# Patient Record
Sex: FEMALE | Race: WHITE | NOT HISPANIC OR LATINO | Employment: FULL TIME | URBAN - METROPOLITAN AREA
[De-identification: names, ages, dates, MRNs, and addresses within clinical notes are randomized per-mention and may not be internally consistent; named-entity substitution may affect disease eponyms.]

---

## 2024-07-08 ENCOUNTER — HOSPITAL ENCOUNTER (EMERGENCY)
Facility: HOSPITAL | Age: 59
Discharge: HOME | End: 2024-07-08
Payer: COMMERCIAL

## 2024-07-08 VITALS
OXYGEN SATURATION: 100 % | HEART RATE: 70 BPM | BODY MASS INDEX: 23.7 KG/M2 | SYSTOLIC BLOOD PRESSURE: 125 MMHG | DIASTOLIC BLOOD PRESSURE: 86 MMHG | HEIGHT: 67 IN | TEMPERATURE: 99.4 F | WEIGHT: 151.01 LBS | RESPIRATION RATE: 16 BRPM
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
## 2024-07-08 PROCEDURE — 2500000001 HC RX 250 WO HCPCS SELF ADMINISTERED DRUGS (ALT 637 FOR MEDICARE OP)

## 2024-07-08 PROCEDURE — 2500000005 HC RX 250 GENERAL PHARMACY W/O HCPCS

## 2024-07-08 PROCEDURE — 2500000004 HC RX 250 GENERAL PHARMACY W/ HCPCS (ALT 636 FOR OP/ED)

## 2024-07-08 PROCEDURE — 12041 INTMD RPR N-HF/GENIT 2.5CM/<: CPT

## 2024-07-08 PROCEDURE — 90715 TDAP VACCINE 7 YRS/> IM: CPT

## 2024-07-08 PROCEDURE — 90471 IMMUNIZATION ADMIN: CPT

## 2024-07-08 PROCEDURE — 12001 RPR S/N/AX/GEN/TRNK 2.5CM/<: CPT

## 2024-07-08 PROCEDURE — 99283 EMERGENCY DEPT VISIT LOW MDM: CPT

## 2024-07-08 RX ORDER — LIDOCAINE HYDROCHLORIDE 10 MG/ML
5 INJECTION INFILTRATION; PERINEURAL ONCE
Status: COMPLETED | OUTPATIENT
Start: 2024-07-08 | End: 2024-07-08

## 2024-07-08 RX ORDER — ACETAMINOPHEN 325 MG/1
975 TABLET ORAL ONCE
Status: COMPLETED | OUTPATIENT
Start: 2024-07-08 | End: 2024-07-08

## 2024-07-08 RX ORDER — AMOXICILLIN AND CLAVULANATE POTASSIUM 875; 125 MG/1; MG/1
1 TABLET, FILM COATED ORAL ONCE
Status: COMPLETED | OUTPATIENT
Start: 2024-07-08 | End: 2024-07-08

## 2024-07-08 RX ORDER — AMOXICILLIN AND CLAVULANATE POTASSIUM 875; 125 MG/1; MG/1
1 TABLET, FILM COATED ORAL 2 TIMES DAILY
Qty: 14 TABLET | Refills: 0 | Status: SHIPPED | OUTPATIENT
Start: 2024-07-08 | End: 2024-07-15

## 2024-07-08 RX ORDER — IBUPROFEN 400 MG/1
400 TABLET ORAL ONCE
Status: COMPLETED | OUTPATIENT
Start: 2024-07-08 | End: 2024-07-08

## 2024-07-08 ASSESSMENT — PAIN DESCRIPTION - ORIENTATION: ORIENTATION: LEFT;RIGHT

## 2024-07-08 ASSESSMENT — PAIN DESCRIPTION - FREQUENCY: FREQUENCY: CONSTANT/CONTINUOUS

## 2024-07-08 ASSESSMENT — PAIN DESCRIPTION - PROGRESSION: CLINICAL_PROGRESSION: NOT CHANGED

## 2024-07-08 ASSESSMENT — PAIN DESCRIPTION - DESCRIPTORS: DESCRIPTORS: THROBBING

## 2024-07-08 ASSESSMENT — PAIN DESCRIPTION - PAIN TYPE
TYPE: ACUTE PAIN
TYPE: ACUTE PAIN

## 2024-07-08 ASSESSMENT — COLUMBIA-SUICIDE SEVERITY RATING SCALE - C-SSRS
1. IN THE PAST MONTH, HAVE YOU WISHED YOU WERE DEAD OR WISHED YOU COULD GO TO SLEEP AND NOT WAKE UP?: NO
2. HAVE YOU ACTUALLY HAD ANY THOUGHTS OF KILLING YOURSELF?: NO
6. HAVE YOU EVER DONE ANYTHING, STARTED TO DO ANYTHING, OR PREPARED TO DO ANYTHING TO END YOUR LIFE?: NO

## 2024-07-08 ASSESSMENT — PAIN SCALES - GENERAL
PAINLEVEL_OUTOF10: 1
PAINLEVEL_OUTOF10: 4

## 2024-07-08 ASSESSMENT — PAIN DESCRIPTION - LOCATION
LOCATION: HAND
LOCATION: HAND

## 2024-07-08 ASSESSMENT — PAIN DESCRIPTION - ONSET: ONSET: SUDDEN

## 2024-07-08 ASSESSMENT — PAIN - FUNCTIONAL ASSESSMENT
PAIN_FUNCTIONAL_ASSESSMENT: 0-10
PAIN_FUNCTIONAL_ASSESSMENT: 0-10

## 2024-07-08 NOTE — ED PROVIDER NOTES
HPI   Chief Complaint   Patient presents with    Animal Bite     I tried to stop the dog from eating chocolate and he bit both of my hand lt hand on the thumb pad and on the rt hand the ring finger it feels throbbing        Patient is a 59-year-old female presents emergency department for evaluation of dog bite to bilateral hands.  Patient states that she dropped a piece of chocolate on the ground her dog went to get it and she tried to pull the dog away from a chocolate.  She states that her dog does have some food aggression and bit her right and left hand.  She sustained puncture wounds to the palm of the left hand and middle finger of the right hand.  She states that her tetanus shot may have been just over 5 years ago.  She is not currently on any blood thinners.  She states the middle finger on her right hand is somewhat numb.  She denies any other bites to the rest of the body.  She states that her dog is masticated up-to-date on all immunizations.  She states that she feels relatively well otherwise.      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  No family history on file.  Social History     Tobacco Use    Smoking status: Not on file    Smokeless tobacco: Not on file   Substance Use Topics    Alcohol use: Not on file    Drug use: Not on file       Physical Exam   ED Triage Vitals [07/08/24 1836]    Temperature Heart Rate Respirations BP   37.4 °C (99.4 °F) 69 18 122/83      Pulse Ox Temp Source Heart Rate Source Patient Position   98 % Temporal Monitor Sitting      BP Location FiO2 (%)     Right arm --       Physical Exam  Constitutional:       Appearance: Normal appearance.   Cardiovascular:      Rate and Rhythm: Normal rate and regular rhythm.   Pulmonary:      Effort: Pulmonary effort is normal.      Breath sounds: Normal breath sounds.   Musculoskeletal:         General: Tenderness and signs of injury present. Normal range of motion.      Comments: Flexion and extension of all digits of bilateral hands intact with some pain with range of motion of thumb to left hand.  Good capillary refill to all digits of bilateral hands.  Some sensation changes to ring finger of right hand.   Skin:     General: Skin is warm and dry.      Comments: Abrasion to right middle finger with puncture wounds and abrasions to right middle finger.  Puncture wounds to left thenar eminence of left palm with 1 cm open laceration with abrasions and small puncture wounds to ulnar aspect of left dorsum of hand.   Neurological:      General: No focal deficit present.      Mental Status: She is alert and oriented to person, place, and time.         ED Course & MDM   Diagnoses as of 07/08/24 2031   Dog bite, initial encounter       Medical Decision Making  Patient is a 59-year-old female presents emergency department for evaluation of dog bite to bilateral hands.    Lab work and scans not warranted at today's visit.    Medications given at today's visit include PO Augmentin, ibuprofen, Tylenol, IM tetanus    I saw this patient independently.  Patient's wounds were copiously cleaned and irrigated by me.  I injected anesthetic into all wounds to allow for copious saline and Betasept irrigation of all wounds to bilateral hands.  Patient tolerated this well.  Small laceration open and gaping to left thenar eminence of left palm which required  1 tacking stitch as detailed in separate procedure note by me to allow for better healing.  Wounds were dressed by me.  Patient was educated on continued wound care.  Tetanus was updated at today's visit.  Patient will be discharged home on oral antibiotics and will follow-up closely outpatient with primary care provider.  Given some numbness and tingling in the right ring finger, she will follow-up closely with orthopedic hand specialist to ensure for good healing of wounds.  She is agreeable to plan and discharge at this time.  Emergent pathologies were considered for this patient, although I have low suspicion for anything acutely emergent given patient's clinical presentation, history, physical exam, stable vital signs, and relatively unremarkable workup.  Discharging patient home is reasonable plan of care for outpatient management.    All labs, imaging, and diagnostic studies were reviewed by me and patient was counseled on clinical impression, expectations, and plan.  Patient was educated to follow-up with PCP in the following 1-2 days.  All questions from patient were answered. They elicited understanding and were agreeable to course of treatment.  Patient was discharged in stable condition and given strict return precautions.    Prescriptions given on discharge: PO Augmentin    ** Disclaimer:  Parts of this document were written utilizing a voice to text dictation software.  Note may contain minor transcription or typographical errors that were inadvertently transcribed by the computer software.        Procedure  Laceration Repair    Performed by: Juju Lopez PA-C  Authorized by: Juju Lopez PA-C    Consent:     Consent obtained:  Verbal    Consent given by:  Patient    Risks, benefits, and alternatives were discussed: yes      Risks discussed:  Infection, poor cosmetic result, poor wound healing, need for additional repair, retained foreign body, vascular damage, tendon damage, pain and nerve  damage    Alternatives discussed:  No treatment  Universal protocol:     Patient identity confirmed:  Verbally with patient  Anesthesia:     Anesthesia method:  Local infiltration    Local anesthetic:  Lidocaine 1% w/o epi  Laceration details:     Location:  Hand    Hand location:  L palm    Length (cm):  1  Exploration:     Wound exploration: wound explored through full range of motion    Treatment:     Area cleansed with:  Chlorhexidine and saline    Amount of cleaning:  Extensive    Irrigation solution:  Sterile saline    Irrigation method:  Syringe  Skin repair:     Repair method:  Sutures    Suture size:  4-0    Suture material:  Nylon    Suture technique:  Simple interrupted    Number of sutures:  1  Approximation:     Approximation:  Close  Repair type:     Repair type:  Intermediate  Post-procedure details:     Dressing:  Bulky dressing    Procedure completion:  Tolerated well, no immediate complications       Juju Lopez PA-C  07/08/24 1218

## 2024-07-09 NOTE — DISCHARGE INSTRUCTIONS
Please follow-up closely with your primary care provider in the following week for wound check.  1 stitch will need removed in 10 to 12 days.    Follow-up given for orthopedic hand specialist should you develop any complications.  Call to schedule appointment.    Take and complete course of antibiotics as prescribed.    Please review carenotes given for instructions on wound care moving forward. There is always a small possibility with any injury of missed fracture, tendon or ligamentous injury, retained foreign body etc. If your symptoms are worsening or wound is not healing, please follow-up closely with PCP for close monitoring for any acute complications.

## 2024-10-27 ENCOUNTER — OFFICE VISIT (OUTPATIENT)
Dept: URGENT CARE | Age: 59
End: 2024-10-27
Payer: COMMERCIAL

## 2024-10-27 VITALS
DIASTOLIC BLOOD PRESSURE: 80 MMHG | SYSTOLIC BLOOD PRESSURE: 126 MMHG | HEART RATE: 62 BPM | WEIGHT: 148 LBS | BODY MASS INDEX: 23.18 KG/M2 | OXYGEN SATURATION: 98 % | TEMPERATURE: 98.3 F | RESPIRATION RATE: 14 BRPM

## 2024-10-27 DIAGNOSIS — B02.8 HERPES ZOSTER WITH COMPLICATION: Primary | ICD-10-CM

## 2024-10-27 PROCEDURE — 99203 OFFICE O/P NEW LOW 30 MIN: CPT | Performed by: FAMILY MEDICINE

## 2024-10-27 RX ORDER — VALACYCLOVIR HYDROCHLORIDE 1 G/1
1000 TABLET, FILM COATED ORAL 3 TIMES DAILY
Qty: 21 TABLET | Refills: 0 | Status: SHIPPED | OUTPATIENT
Start: 2024-10-27 | End: 2024-11-03

## 2024-10-27 NOTE — PROGRESS NOTES
Subjective   Patient ID: Tracey Robbins is a 59 y.o. female. They present today with a chief complaint of Herpes Zoster.    Patient disposition: Home    History of Present Illness  HPI  Past day has felt irritation, burning sensation on the right lateral shoulder, anterior shoulder.  Feels some fatigue.  Small rash starting.  Has not taken anything for it.  No recent illnesses.  No history of shingles in the past.  Area is localized.  Nonradiating.  No numbness or tingling.  No recent injury otherwise.      Past Medical History  Allergies as of 10/27/2024 - Reviewed 10/27/2024   Allergen Reaction Noted    Morphine (pf) Hives 07/08/2024       (Not in a hospital admission)            reports that she has never smoked. She has never used smokeless tobacco.    Review of Systems  As noted in HPI. ROS otherwise negative unless noted.       Objective    Vitals:    10/27/24 1811   BP: 126/80   Pulse: 62   Resp: 14   Temp: 36.8 °C (98.3 °F)   SpO2: 98%   Weight: 67.1 kg (148 lb)     No LMP recorded. Patient is postmenopausal.    Physical Exam  Constitutional: vital signs reviewed. Well developed, well nourished. patient alert and patient without distress.   Psych: Normal mood and affect  Head and Face: Normal and atraumatic.    Cardiovascular: Heart rate normal, normal S1 and S2, no gallops, no murmurs and no pericardial rub. Rhythm: Normal.  Pulmonary: No respiratory distress. Palpation of chest: Normal. Clear bilateral breath sounds.   Skin: Normal skin color and pigmentation, normal skin turgor, and no right upper posterior shoulder with small 2 cm slightly elevated skin lesion.  No vesicles.  Area of discomfort following C6 dermatome on the right.  Suspected shingles prior to eruption.            Procedures    Point of Care Test & Imaging Results from this visit  No results found for this or any previous visit.       Diagnostic study results (if any) were reviewed.    Assessment/Plan   Allergies, medications, history,  and pertinent labs/EKGs/Imaging reviewed.    Medical Decision Making  See note    Orders and Diagnoses  There are no diagnoses linked to this encounter.    Medical Admin Record      Follow Up Instructions  No follow-ups on file.      Electronically signed by [facility]